# Patient Record
Sex: MALE | Race: BLACK OR AFRICAN AMERICAN | NOT HISPANIC OR LATINO | Employment: OTHER | URBAN - METROPOLITAN AREA
[De-identification: names, ages, dates, MRNs, and addresses within clinical notes are randomized per-mention and may not be internally consistent; named-entity substitution may affect disease eponyms.]

---

## 2023-03-31 ENCOUNTER — NURSING HOME VISIT (OUTPATIENT)
Dept: POST ACUTE CARE | Facility: EXTERNAL LOCATION | Age: 63
End: 2023-03-31
Payer: MEDICARE

## 2023-03-31 DIAGNOSIS — I10 PRIMARY HYPERTENSION: ICD-10-CM

## 2023-03-31 DIAGNOSIS — Z79.4 TYPE 2 DIABETES MELLITUS WITH HYPERGLYCEMIA, WITH LONG-TERM CURRENT USE OF INSULIN (MULTI): ICD-10-CM

## 2023-03-31 DIAGNOSIS — F01.50 VASCULAR DEMENTIA WITHOUT BEHAVIORAL DISTURBANCE, PSYCHOTIC DISTURBANCE, MOOD DISTURBANCE, OR ANXIETY, UNSPECIFIED DEMENTIA SEVERITY (MULTI): Primary | ICD-10-CM

## 2023-03-31 DIAGNOSIS — E11.65 TYPE 2 DIABETES MELLITUS WITH HYPERGLYCEMIA, WITH LONG-TERM CURRENT USE OF INSULIN (MULTI): ICD-10-CM

## 2023-03-31 PROCEDURE — 99308 SBSQ NF CARE LOW MDM 20: CPT | Performed by: INTERNAL MEDICINE

## 2023-03-31 NOTE — PROGRESS NOTES
Pt is doing fine , no complaint  GA: Comfortable, no distress  ROS: No SOB  Medications reviewed  Head: Normal  Neck: Soft  Heart: Regular  Lungs: Clear  Abdomen: soft    Impression: clinically doing fine, continue current management    Problem List Items Addressed This Visit          Nervous    Vascular dementia without behavioral disturbance, psychotic disturbance, mood disturbance, or anxiety (CMS/[facility]) - Primary       Circulatory    Primary hypertension       Endocrine/Metabolic    Type 2 diabetes mellitus with hyperglycemia, with long-term current use of insulin (CMS/[facility])

## 2023-03-31 NOTE — LETTER
Patient: Enid Thorne  : 1960    Encounter Date: 2023    Pt is doing fine , no complaint  GA: Comfortable, no distress  ROS: No SOB  Medications reviewed  Head: Normal  Neck: Soft  Heart: Regular  Lungs: Clear  Abdomen: soft    Impression: clinically doing fine, continue current management    Problem List Items Addressed This Visit          Nervous    Vascular dementia without behavioral disturbance, psychotic disturbance, mood disturbance, or anxiety (CMS/HCC) - Primary       Circulatory    Primary hypertension       Endocrine/Metabolic    Type 2 diabetes mellitus with hyperglycemia, with long-term current use of insulin (CMS/[facility])          Electronically Signed By: Chriss Guzman MD   3/31/23  4:43 PM

## 2023-04-21 ENCOUNTER — NURSING HOME VISIT (OUTPATIENT)
Dept: POST ACUTE CARE | Facility: EXTERNAL LOCATION | Age: 63
End: 2023-04-21
Payer: COMMERCIAL

## 2023-04-21 DIAGNOSIS — R60.0 BILATERAL LOWER EXTREMITY EDEMA: Primary | ICD-10-CM

## 2023-04-21 PROCEDURE — 99309 SBSQ NF CARE MODERATE MDM 30: CPT | Performed by: INTERNAL MEDICINE

## 2023-04-21 NOTE — PROGRESS NOTES
Pt was seen in the NH,c/o le edema  General appearance: Comfortable, no distress  PMH DM2 HTN MORBID OBESITY  ROS: No SOB  Medications reviewed  Head: Normal  Neck: Soft  Heart: Regular  Lungs: Clear  Abdomen: soft  Ext: moderate le edema      Impression: stop amlodipine,  increase lasix 40 bid, repeat bmp next week,continue current management    Problem List Items Addressed This Visit    None  Visit Diagnoses       Bilateral lower extremity edema    -  Primary

## 2023-04-21 NOTE — LETTER
Patient: Enid Thorne  : 1960    Encounter Date: 2023    Pt was seen in the NH,c/o le edema  General appearance: Comfortable, no distress  PMH DM2 HTN MORBID OBESITY  ROS: No SOB  Medications reviewed  Head: Normal  Neck: Soft  Heart: Regular  Lungs: Clear  Abdomen: soft  Ext: moderate le edema      Impression: stop amlodipine,  increase lasix 40 bid, repeat bmp next week,continue current management    Problem List Items Addressed This Visit    None  Visit Diagnoses       Bilateral lower extremity edema    -  Primary               Electronically Signed By: Chriss Guzman MD   23  3:04 PM

## 2023-04-28 ENCOUNTER — NURSING HOME VISIT (OUTPATIENT)
Dept: POST ACUTE CARE | Facility: EXTERNAL LOCATION | Age: 63
End: 2023-04-28
Payer: COMMERCIAL

## 2023-04-28 DIAGNOSIS — I10 PRIMARY HYPERTENSION: ICD-10-CM

## 2023-04-28 DIAGNOSIS — F01.B0 MODERATE VASCULAR DEMENTIA WITHOUT BEHAVIORAL DISTURBANCE, PSYCHOTIC DISTURBANCE, MOOD DISTURBANCE, OR ANXIETY (MULTI): Primary | ICD-10-CM

## 2023-04-28 PROCEDURE — 99308 SBSQ NF CARE LOW MDM 20: CPT | Performed by: INTERNAL MEDICINE

## 2023-04-28 NOTE — PROGRESS NOTES
Pt was seen in the NH,Pt is doing fine , no complaint  General appearance: Comfortable, no distress  ROS: No SOB  Medications reviewed  Head: Normal  Neck: Soft  Heart: Regular  Lungs: Clear  Abdomen: soft    Impression: clinically doing fine, continue current management    Problem List Items Addressed This Visit          Nervous    Vascular dementia without behavioral disturbance, psychotic disturbance, mood disturbance, or anxiety (CMS/HCC) - Primary       Circulatory    Primary hypertension

## 2023-04-28 NOTE — LETTER
Patient: Enid Thorne  : 1960    Encounter Date: 2023    Pt was seen in the NH,Pt is doing fine , no complaint  General appearance: Comfortable, no distress  ROS: No SOB  Medications reviewed  Head: Normal  Neck: Soft  Heart: Regular  Lungs: Clear  Abdomen: soft    Impression: clinically doing fine, continue current management    Problem List Items Addressed This Visit          Nervous    Vascular dementia without behavioral disturbance, psychotic disturbance, mood disturbance, or anxiety (CMS/HCC) - Primary       Circulatory    Primary hypertension          Electronically Signed By: Chriss Guzman MD   23  7:37 PM

## 2023-05-29 ENCOUNTER — NURSING HOME VISIT (OUTPATIENT)
Dept: POST ACUTE CARE | Facility: EXTERNAL LOCATION | Age: 63
End: 2023-05-29
Payer: COMMERCIAL

## 2023-05-29 DIAGNOSIS — F01.B0 MODERATE VASCULAR DEMENTIA WITHOUT BEHAVIORAL DISTURBANCE, PSYCHOTIC DISTURBANCE, MOOD DISTURBANCE, OR ANXIETY (MULTI): Primary | ICD-10-CM

## 2023-05-29 DIAGNOSIS — E66.8 MODERATE OBESITY: ICD-10-CM

## 2023-05-29 PROCEDURE — 99308 SBSQ NF CARE LOW MDM 20: CPT | Performed by: INTERNAL MEDICINE

## 2023-05-29 NOTE — LETTER
Patient: Enid Thorne  : 1960    Encounter Date: 2023    Pt was seen in the NH,Pt is doing fine , no complaint  General appearance: Comfortable, no distress  ROS: No SOB  Medications reviewed  Head: Normal  Neck: Soft  Heart: Regular  Lungs: Clear  Abdomen: soft    Impression: clinically doing fine, continue current management    Problem List Items Addressed This Visit          Nervous    Vascular dementia without behavioral disturbance, psychotic disturbance, mood disturbance, or anxiety (CMS/HCC) - Primary     Other Visit Diagnoses       Moderate obesity                   Electronically Signed By: Chriss Guzman MD   23  8:15 PM

## 2023-05-30 NOTE — PROGRESS NOTES
Pt was seen in the NH,Pt is doing fine , no complaint  General appearance: Comfortable, no distress  ROS: No SOB  Medications reviewed  Head: Normal  Neck: Soft  Heart: Regular  Lungs: Clear  Abdomen: soft    Impression: clinically doing fine, continue current management    Problem List Items Addressed This Visit          Nervous    Vascular dementia without behavioral disturbance, psychotic disturbance, mood disturbance, or anxiety (CMS/HCC) - Primary     Other Visit Diagnoses       Moderate obesity

## 2023-06-01 ENCOUNTER — NURSING HOME VISIT (OUTPATIENT)
Dept: POST ACUTE CARE | Facility: EXTERNAL LOCATION | Age: 63
End: 2023-06-01
Payer: COMMERCIAL

## 2023-06-01 DIAGNOSIS — L30.9 DERMATITIS: Primary | ICD-10-CM

## 2023-06-01 PROCEDURE — 99308 SBSQ NF CARE LOW MDM 20: CPT | Performed by: NURSE PRACTITIONER

## 2023-06-01 NOTE — LETTER
Patient: Enid Thorne  : 1960    Encounter Date: 2023    Subjective  Patient ID: Enid Thorne is a 62 y.o. male who presents for No chief complaint on file..  63 yo male at [facility] with history of SANCHO, DMII, HTN, CKD.  Staff reports resident has been using Clobetasol Propanate since January for dermatitis on left foot without success and would like this medication discontinued.          Review of Systems   Constitutional:  Negative for fever.   Respiratory:  Negative for cough, shortness of breath and wheezing.         History of SANCHO   Cardiovascular:  Positive for leg swelling. Negative for chest pain.   Genitourinary:         Incontinent of urine and bowel   Skin:         Dermatitis on left foot.  Other skin without dryness or rashes.       Objective  Physical Exam  Constitutional:       Appearance: He is obese.   Cardiovascular:      Rate and Rhythm: Normal rate and regular rhythm.   Pulmonary:      Effort: Pulmonary effort is normal.      Breath sounds: Normal breath sounds.   Abdominal:      General: Bowel sounds are normal.   Musculoskeletal:         General: Swelling present.   Skin:     General: Skin is warm and dry.      Comments: Raised erythematous area on left dorsal foot, approximately 3cm by 3cm.  No open areas to feet noted.     Neurological:      Mental Status: He is alert.         No current outpatient medications on file.     Assessment/Plan  Problem List Items Addressed This Visit          Infectious/Inflammatory    Dermatitis - Primary   Refer back to dermatologist for skin treatment.             Electronically Signed By: BRANDON Myrick   23  7:25 AM

## 2023-06-07 PROBLEM — L30.9 DERMATITIS: Status: ACTIVE | Noted: 2023-06-07

## 2023-06-07 ASSESSMENT — ENCOUNTER SYMPTOMS
SHORTNESS OF BREATH: 0
WHEEZING: 0
COUGH: 0
FEVER: 0

## 2023-06-07 NOTE — PROGRESS NOTES
Subjective   Patient ID: Enid Thorne is a 62 y.o. male who presents for No chief complaint on file..  63 yo male at [facility] with history of SANCHO, DMII, HTN, CKD.  Staff reports resident has been using Clobetasol Propanate since January for dermatitis on left foot without success and would like this medication discontinued.          Review of Systems   Constitutional:  Negative for fever.   Respiratory:  Negative for cough, shortness of breath and wheezing.         History of SANCHO   Cardiovascular:  Positive for leg swelling. Negative for chest pain.   Genitourinary:         Incontinent of urine and bowel   Skin:         Dermatitis on left foot.  Other skin without dryness or rashes.       Objective   Physical Exam  Constitutional:       Appearance: He is obese.   Cardiovascular:      Rate and Rhythm: Normal rate and regular rhythm.   Pulmonary:      Effort: Pulmonary effort is normal.      Breath sounds: Normal breath sounds.   Abdominal:      General: Bowel sounds are normal.   Musculoskeletal:         General: Swelling present.   Skin:     General: Skin is warm and dry.      Comments: Raised erythematous area on left dorsal foot, approximately 3cm by 3cm.  No open areas to feet noted.     Neurological:      Mental Status: He is alert.         No current outpatient medications on file.     Assessment/Plan   Problem List Items Addressed This Visit          Infectious/Inflammatory    Dermatitis - Primary   Refer back to dermatologist for skin treatment.

## 2023-06-25 ENCOUNTER — NURSING HOME VISIT (OUTPATIENT)
Dept: POST ACUTE CARE | Facility: EXTERNAL LOCATION | Age: 63
End: 2023-06-25
Payer: COMMERCIAL

## 2023-06-25 DIAGNOSIS — Z79.4 TYPE 2 DIABETES MELLITUS WITH HYPERGLYCEMIA, WITH LONG-TERM CURRENT USE OF INSULIN (MULTI): ICD-10-CM

## 2023-06-25 DIAGNOSIS — E11.65 TYPE 2 DIABETES MELLITUS WITH HYPERGLYCEMIA, WITH LONG-TERM CURRENT USE OF INSULIN (MULTI): ICD-10-CM

## 2023-06-25 DIAGNOSIS — F01.B0 MODERATE VASCULAR DEMENTIA WITHOUT BEHAVIORAL DISTURBANCE, PSYCHOTIC DISTURBANCE, MOOD DISTURBANCE, OR ANXIETY (MULTI): Primary | ICD-10-CM

## 2023-06-25 PROCEDURE — 99308 SBSQ NF CARE LOW MDM 20: CPT | Performed by: INTERNAL MEDICINE

## 2023-06-25 NOTE — LETTER
Patient: Enid Thorne  : 1960    Encounter Date: 2023    Pt was seen in the NH,Pt is in his usual state , no complaint  General appearance: Comfortable, no distress  ROS: No SOB  Medications reviewed  Head: Normal  Neck: Soft  Heart: Regular  Lungs: Clear  Abdomen: soft    Impression: clinically doing fine, continue current management    Problem List Items Addressed This Visit       Vascular dementia without behavioral disturbance, psychotic disturbance, mood disturbance, or anxiety (CMS/HCC) - Primary    Type 2 diabetes mellitus with hyperglycemia, with long-term current use of insulin (CMS/HCC)          Electronically Signed By: Chriss Guzman MD   23  5:06 PM

## 2023-06-25 NOTE — PROGRESS NOTES
Pt was seen in the NH,Pt is in his usual state , no complaint  General appearance: Comfortable, no distress  ROS: No SOB  Medications reviewed  Head: Normal  Neck: Soft  Heart: Regular  Lungs: Clear  Abdomen: soft    Impression: clinically doing fine, continue current management    Problem List Items Addressed This Visit       Vascular dementia without behavioral disturbance, psychotic disturbance, mood disturbance, or anxiety (CMS/HCC) - Primary    Type 2 diabetes mellitus with hyperglycemia, with long-term current use of insulin (CMS/[facility])

## 2023-07-30 ENCOUNTER — NURSING HOME VISIT (OUTPATIENT)
Dept: POST ACUTE CARE | Facility: EXTERNAL LOCATION | Age: 63
End: 2023-07-30
Payer: COMMERCIAL

## 2023-07-30 DIAGNOSIS — F01.50 VASCULAR DEMENTIA WITHOUT BEHAVIORAL DISTURBANCE, PSYCHOTIC DISTURBANCE, MOOD DISTURBANCE, OR ANXIETY, UNSPECIFIED DEMENTIA SEVERITY (MULTI): Primary | ICD-10-CM

## 2023-07-30 DIAGNOSIS — E11.3512 TYPE 2 DIABETES MELLITUS WITH LEFT EYE AFFECTED BY PROLIFERATIVE RETINOPATHY AND MACULAR EDEMA, WITH LONG-TERM CURRENT USE OF INSULIN (MULTI): ICD-10-CM

## 2023-07-30 DIAGNOSIS — F32.4 MAJOR DEPRESSIVE DISORDER, SINGLE EPISODE, IN PARTIAL REMISSION (CMS-HCC): ICD-10-CM

## 2023-07-30 DIAGNOSIS — Z89.419: ICD-10-CM

## 2023-07-30 DIAGNOSIS — N18.32 STAGE 3B CHRONIC KIDNEY DISEASE (MULTI): ICD-10-CM

## 2023-07-30 DIAGNOSIS — Z79.4 TYPE 2 DIABETES MELLITUS WITH LEFT EYE AFFECTED BY PROLIFERATIVE RETINOPATHY AND MACULAR EDEMA, WITH LONG-TERM CURRENT USE OF INSULIN (MULTI): ICD-10-CM

## 2023-07-30 PROCEDURE — 99308 SBSQ NF CARE LOW MDM 20: CPT | Performed by: INTERNAL MEDICINE

## 2023-07-30 NOTE — LETTER
Patient: Enid Thorne  : 1960    Encounter Date: 2023    Pt was seen in the NH,Pt is in usual state , no complaint  General appearance: Comfortable, no distress  ROS: No SOB  Medications reviewed  Head: Normal  Neck: Soft  Heart: Regular  Lungs: Clear  Abdomen: soft    Impression: clinically doing fine, continue current management  ALL ASSESSED CHRONIC CONDITIONS ARE STABLE OR ADDRESSED.      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

## 2023-07-31 NOTE — PROGRESS NOTES
Pt was seen in the NH,Pt is in usual state , no complaint  General appearance: Comfortable, no distress  ROS: No SOB  Medications reviewed  Head: Normal  Neck: Soft  Heart: Regular  Lungs: Clear  Abdomen: soft    Impression: clinically doing fine, continue current management  ALL ASSESSED CHRONIC CONDITIONS ARE STABLE OR ADDRESSED.      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

## 2023-08-31 ENCOUNTER — NURSING HOME VISIT (OUTPATIENT)
Dept: POST ACUTE CARE | Facility: EXTERNAL LOCATION | Age: 63
End: 2023-08-31
Payer: MEDICARE

## 2023-08-31 DIAGNOSIS — F01.B0 MODERATE VASCULAR DEMENTIA WITHOUT BEHAVIORAL DISTURBANCE, PSYCHOTIC DISTURBANCE, MOOD DISTURBANCE, OR ANXIETY (MULTI): Primary | ICD-10-CM

## 2023-08-31 DIAGNOSIS — E66.01 MORBID OBESITY (MULTI): ICD-10-CM

## 2023-08-31 PROCEDURE — 99308 SBSQ NF CARE LOW MDM 20: CPT | Performed by: INTERNAL MEDICINE

## 2023-08-31 NOTE — PROGRESS NOTES
Pt was seen in the NH.  Pt is in usual state , no complaint  General appearance: Comfortable, no distress  ROS: No SOB  Medications reviewed  Head: Normal  Neck: Soft  Heart: Regular  Lungs: Clear  Abdomen: soft    Impression: clinically doing fine, continue current management    Problem List Items Addressed This Visit       Vascular dementia without behavioral disturbance, psychotic disturbance, mood disturbance, or anxiety (CMS/HCC) - Primary     Other Visit Diagnoses       Morbid obesity (CMS/HCC)

## 2023-08-31 NOTE — LETTER
Patient: Enid Thorne  : 1960    Encounter Date: 2023    Pt was seen in the NH.  Pt is in usual state , no complaint  General appearance: Comfortable, no distress  ROS: No SOB  Medications reviewed  Head: Normal  Neck: Soft  Heart: Regular  Lungs: Clear  Abdomen: soft    Impression: clinically doing fine, continue current management    Problem List Items Addressed This Visit       Vascular dementia without behavioral disturbance, psychotic disturbance, mood disturbance, or anxiety (CMS/HCC) - Primary     Other Visit Diagnoses       Morbid obesity (CMS/HCC)                   Electronically Signed By: Chriss Guzman MD   23  7:43 PM

## 2023-09-27 LAB — HEMOGLOBIN A1C/HEMOGLOBIN TOTAL IN BLOOD EXTERNAL: 6.4 %

## 2023-09-28 ENCOUNTER — NURSING HOME VISIT (OUTPATIENT)
Dept: POST ACUTE CARE | Facility: EXTERNAL LOCATION | Age: 63
End: 2023-09-28
Payer: MEDICARE

## 2023-09-28 DIAGNOSIS — Z79.4 TYPE 2 DIABETES MELLITUS WITH HYPERGLYCEMIA, WITH LONG-TERM CURRENT USE OF INSULIN (MULTI): ICD-10-CM

## 2023-09-28 DIAGNOSIS — F01.B0 MODERATE VASCULAR DEMENTIA WITHOUT BEHAVIORAL DISTURBANCE, PSYCHOTIC DISTURBANCE, MOOD DISTURBANCE, OR ANXIETY (MULTI): Primary | ICD-10-CM

## 2023-09-28 DIAGNOSIS — E11.65 TYPE 2 DIABETES MELLITUS WITH HYPERGLYCEMIA, WITH LONG-TERM CURRENT USE OF INSULIN (MULTI): ICD-10-CM

## 2023-09-28 DIAGNOSIS — R53.81 DEBILITY: ICD-10-CM

## 2023-09-28 PROCEDURE — 99308 SBSQ NF CARE LOW MDM 20: CPT | Performed by: INTERNAL MEDICINE

## 2023-09-28 NOTE — LETTER
Patient: Enid Thorne  : 1960    Encounter Date: 2023    Pt was seen in the NH.  Pt is in usual state , no complaint  General appearance: Comfortable, no distress  ROS: No SOB  Medications reviewed  Head: Normal  Neck: Soft  Heart: Regular  Lungs: Clear  Abdomen: soft    Impression: clinically doing fine, continue current management    Problem List Items Addressed This Visit       Vascular dementia without behavioral disturbance, psychotic disturbance, mood disturbance, or anxiety (CMS/HCC) - Primary    Type 2 diabetes mellitus with hyperglycemia, with long-term current use of insulin (CMS/[facility])     Other Visit Diagnoses       Debility                   Electronically Signed By: Chriss Guzman MD   23 12:38 PM

## 2023-09-28 NOTE — PROGRESS NOTES
Pt was seen in the NH.  Pt is in usual state , no complaint  General appearance: Comfortable, no distress  ROS: No SOB  Medications reviewed  Head: Normal  Neck: Soft  Heart: Regular  Lungs: Clear  Abdomen: soft    Impression: clinically doing fine, continue current management    Problem List Items Addressed This Visit       Vascular dementia without behavioral disturbance, psychotic disturbance, mood disturbance, or anxiety (CMS/HCC) - Primary    Type 2 diabetes mellitus with hyperglycemia, with long-term current use of insulin (CMS/[facility])     Other Visit Diagnoses       Debility

## 2023-10-07 ENCOUNTER — NURSING HOME VISIT (OUTPATIENT)
Dept: POST ACUTE CARE | Facility: EXTERNAL LOCATION | Age: 63
End: 2023-10-07
Payer: MEDICARE

## 2023-10-07 VITALS — SYSTOLIC BLOOD PRESSURE: 104 MMHG | WEIGHT: 315 LBS | DIASTOLIC BLOOD PRESSURE: 64 MMHG

## 2023-10-07 DIAGNOSIS — F01.B0 MODERATE VASCULAR DEMENTIA WITHOUT BEHAVIORAL DISTURBANCE, PSYCHOTIC DISTURBANCE, MOOD DISTURBANCE, OR ANXIETY (MULTI): ICD-10-CM

## 2023-10-07 DIAGNOSIS — Z00.00 HEALTHCARE MAINTENANCE: Primary | ICD-10-CM

## 2023-10-07 DIAGNOSIS — R53.81 DEBILITY: ICD-10-CM

## 2023-10-07 PROCEDURE — G0439 PPPS, SUBSEQ VISIT: HCPCS | Performed by: INTERNAL MEDICINE

## 2023-10-07 ASSESSMENT — ACTIVITIES OF DAILY LIVING (ADL)
DOING_HOUSEWORK: TOTAL CARE
MANAGING_FINANCES: TOTAL CARE
DRESSING: DEPENDENT
GROCERY_SHOPPING: TOTAL CARE
BATHING: DEPENDENT
TAKING_MEDICATION: TOTAL CARE

## 2023-10-07 NOTE — PROGRESS NOTES
Pt was seen in the NH for wellness exam  Pt is in usual state , no complaint  General appearance: Comfortable, no distress  No screening test indicated    Problem List Items Addressed This Visit       Vascular dementia without behavioral disturbance, psychotic disturbance, mood disturbance, or anxiety (CMS/HCC)    Debility     Other Visit Diagnoses       Healthcare maintenance    -  Primary

## 2023-10-07 NOTE — LETTER
Patient: Enid Thorne  : 1960    Encounter Date: 10/07/2023    Pt was seen in the NH for wellness exam  Pt is in usual state , no complaint  General appearance: Comfortable, no distress  No screening test indicated    Problem List Items Addressed This Visit       Vascular dementia without behavioral disturbance, psychotic disturbance, mood disturbance, or anxiety (CMS/HCC)    Debility     Other Visit Diagnoses       Healthcare maintenance    -  Primary                 Electronically Signed By: Chriss Guzman MD   10/7/23  2:53 PM

## 2023-10-30 ENCOUNTER — NURSING HOME VISIT (OUTPATIENT)
Dept: POST ACUTE CARE | Facility: EXTERNAL LOCATION | Age: 63
End: 2023-10-30
Payer: MEDICARE

## 2023-10-30 DIAGNOSIS — F01.B0 MODERATE VASCULAR DEMENTIA WITHOUT BEHAVIORAL DISTURBANCE, PSYCHOTIC DISTURBANCE, MOOD DISTURBANCE, OR ANXIETY (MULTI): ICD-10-CM

## 2023-10-30 DIAGNOSIS — R53.81 DEBILITY: Primary | ICD-10-CM

## 2023-10-30 PROCEDURE — 99308 SBSQ NF CARE LOW MDM 20: CPT | Performed by: INTERNAL MEDICINE

## 2023-10-30 NOTE — LETTER
Patient: Enid Thorne  : 1960    Encounter Date: 10/30/2023    Pt was seen in the NH.  Pt is in usual state , no complaint  General appearance: Comfortable, no distress  ROS: No SOB  Medications reviewed  Head: Normal  Neck: Soft  Heart: Regular  Lungs: Clear  Abdomen: soft    Impression: clinically doing fine, continue current management    Problem List Items Addressed This Visit       Vascular dementia without behavioral disturbance, psychotic disturbance, mood disturbance, or anxiety (CMS/HCC)    Debility - Primary          Electronically Signed By: Chriss Guzman MD   10/30/23 10:48 PM

## 2023-10-31 NOTE — PROGRESS NOTES
Pt was seen in the NH.  Pt is in usual state , no complaint  General appearance: Comfortable, no distress  ROS: No SOB  Medications reviewed  Head: Normal  Neck: Soft  Heart: Regular  Lungs: Clear  Abdomen: soft    Impression: clinically doing fine, continue current management    Problem List Items Addressed This Visit       Vascular dementia without behavioral disturbance, psychotic disturbance, mood disturbance, or anxiety (CMS/HCC)    Debility - Primary

## 2023-11-28 ENCOUNTER — NURSING HOME VISIT (OUTPATIENT)
Dept: POST ACUTE CARE | Facility: EXTERNAL LOCATION | Age: 63
End: 2023-11-28
Payer: MEDICARE

## 2023-11-28 DIAGNOSIS — E11.65 TYPE 2 DIABETES MELLITUS WITH HYPERGLYCEMIA, WITH LONG-TERM CURRENT USE OF INSULIN (MULTI): Primary | ICD-10-CM

## 2023-11-28 DIAGNOSIS — F01.B0 MODERATE VASCULAR DEMENTIA WITHOUT BEHAVIORAL DISTURBANCE, PSYCHOTIC DISTURBANCE, MOOD DISTURBANCE, OR ANXIETY (MULTI): ICD-10-CM

## 2023-11-28 DIAGNOSIS — Z79.4 TYPE 2 DIABETES MELLITUS WITH HYPERGLYCEMIA, WITH LONG-TERM CURRENT USE OF INSULIN (MULTI): Primary | ICD-10-CM

## 2023-11-28 PROCEDURE — 99308 SBSQ NF CARE LOW MDM 20: CPT | Performed by: INTERNAL MEDICINE

## 2023-11-28 NOTE — LETTER
Patient: Enid Thorne  : 1960    Encounter Date: 2023    Pt was seen in the NH.  Pt is in usual state , no complaint  General appearance: Comfortable, no distress  ROS: No SOB  Medications reviewed  Head: Normal  Neck: Soft  Heart: Regular  Lungs: Clear  Abdomen: soft    Impression: clinically doing fine, continue current management    Problem List Items Addressed This Visit       Vascular dementia without behavioral disturbance, psychotic disturbance, mood disturbance, or anxiety (CMS/HCC)    Type 2 diabetes mellitus with hyperglycemia, with long-term current use of insulin (CMS/HCC) - Primary          Electronically Signed By: Chriss Guzman MD   23  3:45 PM

## 2023-11-28 NOTE — PROGRESS NOTES
Pt was seen in the NH.  Pt is in usual state , no complaint  General appearance: Comfortable, no distress  ROS: No SOB  Medications reviewed  Head: Normal  Neck: Soft  Heart: Regular  Lungs: Clear  Abdomen: soft    Impression: clinically doing fine, continue current management    Problem List Items Addressed This Visit       Vascular dementia without behavioral disturbance, psychotic disturbance, mood disturbance, or anxiety (CMS/HCC)    Type 2 diabetes mellitus with hyperglycemia, with long-term current use of insulin (CMS/HCC) - Primary

## 2023-12-16 ENCOUNTER — NURSING HOME VISIT (OUTPATIENT)
Dept: POST ACUTE CARE | Facility: EXTERNAL LOCATION | Age: 63
End: 2023-12-16
Payer: MEDICARE

## 2023-12-16 DIAGNOSIS — R53.81 DEBILITY: Primary | ICD-10-CM

## 2023-12-16 DIAGNOSIS — N18.32 STAGE 3B CHRONIC KIDNEY DISEASE (MULTI): ICD-10-CM

## 2023-12-16 PROCEDURE — 99308 SBSQ NF CARE LOW MDM 20: CPT | Performed by: INTERNAL MEDICINE

## 2023-12-16 NOTE — PROGRESS NOTES
Pt was seen in the NH.  Pt is in usual state , no complaint  General appearance: Comfortable, no distress  ROS: No SOB  Medications reviewed  Head: Normal  Neck: Soft  Heart: Regular  Lungs: Clear  Abdomen: soft    Impression: clinically doing fine, continue current management    Problem List Items Addressed This Visit       Stage 3b chronic kidney disease (CMS/HCC)    Debility - Primary

## 2023-12-16 NOTE — LETTER
Patient: Enid Thorne  : 1960    Encounter Date: 2023    Pt was seen in the NH.  Pt is in usual state , no complaint  General appearance: Comfortable, no distress  ROS: No SOB  Medications reviewed  Head: Normal  Neck: Soft  Heart: Regular  Lungs: Clear  Abdomen: soft    Impression: clinically doing fine, continue current management    Problem List Items Addressed This Visit       Stage 3b chronic kidney disease (CMS/HCC)    Debility - Primary          Electronically Signed By: Chriss Guzman MD   23  5:55 PM

## 2024-01-31 ENCOUNTER — NURSING HOME VISIT (OUTPATIENT)
Dept: POST ACUTE CARE | Facility: EXTERNAL LOCATION | Age: 64
End: 2024-01-31
Payer: MEDICARE

## 2024-01-31 DIAGNOSIS — E11.65 TYPE 2 DIABETES MELLITUS WITH HYPERGLYCEMIA, WITH LONG-TERM CURRENT USE OF INSULIN (MULTI): ICD-10-CM

## 2024-01-31 DIAGNOSIS — E66.01 MORBID OBESITY (MULTI): ICD-10-CM

## 2024-01-31 DIAGNOSIS — E11.3512 TYPE 2 DIABETES MELLITUS WITH LEFT EYE AFFECTED BY PROLIFERATIVE RETINOPATHY AND MACULAR EDEMA, WITH LONG-TERM CURRENT USE OF INSULIN (MULTI): ICD-10-CM

## 2024-01-31 DIAGNOSIS — F32.4 MAJOR DEPRESSIVE DISORDER, SINGLE EPISODE, IN PARTIAL REMISSION (CMS-HCC): ICD-10-CM

## 2024-01-31 DIAGNOSIS — Z79.4 TYPE 2 DIABETES MELLITUS WITH LEFT EYE AFFECTED BY PROLIFERATIVE RETINOPATHY AND MACULAR EDEMA, WITH LONG-TERM CURRENT USE OF INSULIN (MULTI): ICD-10-CM

## 2024-01-31 DIAGNOSIS — G81.10 SPASTIC HEMIPLEGIA, UNSPECIFIED ETIOLOGY, UNSPECIFIED LATERALITY (MULTI): Primary | ICD-10-CM

## 2024-01-31 DIAGNOSIS — I73.9 PERIPHERAL VASCULAR DISEASE, UNSPECIFIED (CMS-HCC): ICD-10-CM

## 2024-01-31 DIAGNOSIS — F01.B0 MODERATE VASCULAR DEMENTIA WITHOUT BEHAVIORAL DISTURBANCE, PSYCHOTIC DISTURBANCE, MOOD DISTURBANCE, OR ANXIETY (MULTI): ICD-10-CM

## 2024-01-31 DIAGNOSIS — N18.32 STAGE 3B CHRONIC KIDNEY DISEASE (MULTI): ICD-10-CM

## 2024-01-31 DIAGNOSIS — Z79.4 TYPE 2 DIABETES MELLITUS WITH HYPERGLYCEMIA, WITH LONG-TERM CURRENT USE OF INSULIN (MULTI): ICD-10-CM

## 2024-01-31 PROCEDURE — 99308 SBSQ NF CARE LOW MDM 20: CPT | Performed by: INTERNAL MEDICINE

## 2024-01-31 NOTE — PROGRESS NOTES
Pt was seen in the NH.  Pt is in usual state , no complaint  General appearance: Comfortable, no distress  ROS: No SOB  Medications reviewed  Head: Normal  Neck: Soft  Heart: Regular  Lungs: Clear  Abdomen: soft    Impression: clinically doing fine,comfort type of care, continue current management    Problem List Items Addressed This Visit       Vascular dementia without behavioral disturbance, psychotic disturbance, mood disturbance, or anxiety (CMS/[facility])    Type 2 diabetes mellitus with hyperglycemia, with long-term current use of insulin (CMS/[facility])    Major depressive disorder, single episode, in partial remission (CMS/[facility])    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

## 2024-01-31 NOTE — LETTER
Patient: Enid Thorne  : 1960    Encounter Date: 2024    Pt was seen in the NH.  Pt is in usual state , no complaint  General appearance: Comfortable, no distress  ROS: No SOB  Medications reviewed  Head: Normal  Neck: Soft  Heart: Regular  Lungs: Clear  Abdomen: soft    Impression: clinically doing fine,comfort type of care, continue current management    Problem List Items Addressed This Visit       Vascular dementia without behavioral disturbance, psychotic disturbance, mood disturbance, or anxiety (CMS/HCC)    Type 2 diabetes mellitus with hyperglycemia, with long-term current use of insulin (CMS/HCC)    Major depressive disorder, single episode, in partial remission (CMS/HCC)    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

## 2024-02-28 ENCOUNTER — NURSING HOME VISIT (OUTPATIENT)
Dept: POST ACUTE CARE | Facility: EXTERNAL LOCATION | Age: 64
End: 2024-02-28
Payer: MEDICARE

## 2024-02-28 DIAGNOSIS — R53.81 DEBILITY: ICD-10-CM

## 2024-02-28 DIAGNOSIS — F01.B0 MODERATE VASCULAR DEMENTIA WITHOUT BEHAVIORAL DISTURBANCE, PSYCHOTIC DISTURBANCE, MOOD DISTURBANCE, OR ANXIETY (MULTI): Primary | ICD-10-CM

## 2024-02-28 PROCEDURE — 99308 SBSQ NF CARE LOW MDM 20: CPT | Performed by: INTERNAL MEDICINE

## 2024-02-28 NOTE — LETTER
Patient: Enid Thorne  : 1960    Encounter Date: 2024    Pt was seen in the NH.  Pt is in usual state , no complaint  General appearance: Comfortable, no distress  ROS: No SOB  Medications reviewed  Head: Normal  Neck: Soft  Heart: Regular  Lungs: Clear  Abdomen: soft    Impression: clinically doing fine, continue current management    Problem List Items Addressed This Visit       Vascular dementia without behavioral disturbance, psychotic disturbance, mood disturbance, or anxiety (CMS/HCC) - Primary    Debility          Electronically Signed By: Chriss Guzman MD   24  6:25 PM

## 2024-02-28 NOTE — PROGRESS NOTES
Pt was seen in the NH.  Pt is in usual state , no complaint  General appearance: Comfortable, no distress  ROS: No SOB  Medications reviewed  Head: Normal  Neck: Soft  Heart: Regular  Lungs: Clear  Abdomen: soft    Impression: clinically doing fine, continue current management    Problem List Items Addressed This Visit       Vascular dementia without behavioral disturbance, psychotic disturbance, mood disturbance, or anxiety (CMS/HCC) - Primary    Debility

## 2024-03-27 ENCOUNTER — NURSING HOME VISIT (OUTPATIENT)
Dept: POST ACUTE CARE | Facility: EXTERNAL LOCATION | Age: 64
End: 2024-03-27
Payer: MEDICARE

## 2024-03-27 DIAGNOSIS — E66.01 MORBID OBESITY (MULTI): ICD-10-CM

## 2024-03-27 DIAGNOSIS — R53.81 DEBILITY: ICD-10-CM

## 2024-03-27 DIAGNOSIS — F01.B0 MODERATE VASCULAR DEMENTIA WITHOUT BEHAVIORAL DISTURBANCE, PSYCHOTIC DISTURBANCE, MOOD DISTURBANCE, OR ANXIETY (MULTI): Primary | ICD-10-CM

## 2024-03-27 PROCEDURE — 99308 SBSQ NF CARE LOW MDM 20: CPT | Performed by: INTERNAL MEDICINE

## 2024-03-27 NOTE — PROGRESS NOTES
Pt was seen in the NH.  Pt is in usual state , no complaint  General appearance: Comfortable, no distress  ROS: No SOB  Medications reviewed  Head: Normal  Neck: Soft  Heart: Regular  Lungs: Clear  Abdomen: soft    Impression: clinically doing fine, continue current management    Problem List Items Addressed This Visit       Vascular dementia without behavioral disturbance, psychotic disturbance, mood disturbance, or anxiety (CMS/HCC) - Primary    Debility    Morbid obesity (CMS/HCC)

## 2024-03-27 NOTE — LETTER
Patient: Enid Thorne  : 1960    Encounter Date: 2024    Pt was seen in the NH.  Pt is in usual state , no complaint  General appearance: Comfortable, no distress  ROS: No SOB  Medications reviewed  Head: Normal  Neck: Soft  Heart: Regular  Lungs: Clear  Abdomen: soft    Impression: clinically doing fine, continue current management    Problem List Items Addressed This Visit       Vascular dementia without behavioral disturbance, psychotic disturbance, mood disturbance, or anxiety (CMS/HCC) - Primary    Debility    Morbid obesity (CMS/HCC)          Electronically Signed By: Chriss Guzman MD   3/27/24  3:49 PM

## 2024-04-29 ENCOUNTER — NURSING HOME VISIT (OUTPATIENT)
Dept: POST ACUTE CARE | Facility: EXTERNAL LOCATION | Age: 64
End: 2024-04-29
Payer: MEDICARE

## 2024-04-29 DIAGNOSIS — E11.65 TYPE 2 DIABETES MELLITUS WITH HYPERGLYCEMIA, WITH LONG-TERM CURRENT USE OF INSULIN (MULTI): Primary | ICD-10-CM

## 2024-04-29 DIAGNOSIS — Z79.4 TYPE 2 DIABETES MELLITUS WITH HYPERGLYCEMIA, WITH LONG-TERM CURRENT USE OF INSULIN (MULTI): Primary | ICD-10-CM

## 2024-04-29 PROCEDURE — 99308 SBSQ NF CARE LOW MDM 20: CPT | Performed by: INTERNAL MEDICINE

## 2024-04-29 NOTE — LETTER
Patient: Enid Thorne  : 1960    Encounter Date: 2024    Pt was seen in the NH.  Pt is in usual state , no complaint  General appearance: Comfortable, no distress  ROS: No SOB  Medications reviewed  Head: Normal  Neck: Soft  Heart: Regular  Lungs: Clear  Abdomen: soft    Plan:   1)clinically doing fine  2) To continue Fiasp 10 am 18 noon 16 pm    Problem List Items Addressed This Visit       Type 2 diabetes mellitus with hyperglycemia, with long-term current use of insulin ([facility]) - Primary          Electronically Signed By: Chriss Guzman MD   24  8:33 PM

## 2024-04-30 NOTE — PROGRESS NOTES
Pt was seen in the NH.  Pt is in usual state , no complaint  General appearance: Comfortable, no distress  ROS: No SOB  Medications reviewed  Head: Normal  Neck: Soft  Heart: Regular  Lungs: Clear  Abdomen: soft    Plan:   1)clinically doing fine  2) To continue Fiasp 10 am 18 noon 16 pm    Problem List Items Addressed This Visit       Type 2 diabetes mellitus with hyperglycemia, with long-term current use of insulin (Multi) - Primary

## 2024-05-31 ENCOUNTER — NURSING HOME VISIT (OUTPATIENT)
Dept: POST ACUTE CARE | Facility: EXTERNAL LOCATION | Age: 64
End: 2024-05-31
Payer: MEDICARE

## 2024-05-31 DIAGNOSIS — I10 PRIMARY HYPERTENSION: Primary | ICD-10-CM

## 2024-05-31 PROCEDURE — 99308 SBSQ NF CARE LOW MDM 20: CPT | Performed by: INTERNAL MEDICINE

## 2024-05-31 NOTE — LETTER
Patient: Enid Thorne  : 1960    Encounter Date: 2024    Pt was seen in the NH.  Pt is in usual state , no complaint  General appearance: Comfortable, no distress  ROS: No SOB  Medications reviewed  Head: Normal  Neck: Soft  Heart: Regular  Lungs: Clear  Abdomen: soft    Plan:   1)clinically doing fine  2) To dc lisinopril    Problem List Items Addressed This Visit       Primary hypertension - Primary          Electronically Signed By: Chriss Guzman MD   24 10:02 PM

## 2024-06-01 NOTE — PROGRESS NOTES
Pt was seen in the NH.  Pt is in usual state , no complaint  General appearance: Comfortable, no distress  ROS: No SOB  Medications reviewed  Head: Normal  Neck: Soft  Heart: Regular  Lungs: Clear  Abdomen: soft    Plan:   1)clinically doing fine  2) To dc lisinopril    Problem List Items Addressed This Visit       Primary hypertension - Primary

## 2024-06-26 ENCOUNTER — NURSING HOME VISIT (OUTPATIENT)
Dept: POST ACUTE CARE | Facility: EXTERNAL LOCATION | Age: 64
End: 2024-06-26
Payer: MEDICARE

## 2024-06-26 DIAGNOSIS — E78.00 HYPERCHOLESTEROLEMIA: Primary | ICD-10-CM

## 2024-06-26 PROCEDURE — 99308 SBSQ NF CARE LOW MDM 20: CPT | Performed by: INTERNAL MEDICINE

## 2024-06-26 NOTE — PROGRESS NOTES
Pt was seen in the NH.  Pt is in usual state , no complaint  General appearance: Comfortable, no distress  ROS: No SOB  Medications reviewed  Head: Normal  Neck: Soft  Heart: Regular  Lungs: Clear  Abdomen: soft    Plan:   1)clinically doing fine  2) To continue lipitor 80 mg daily    Problem List Items Addressed This Visit    None  Visit Diagnoses       Hypercholesterolemia    -  Primary

## 2024-06-26 NOTE — LETTER
Patient: Enid Thorne  : 1960    Encounter Date: 2024    Pt was seen in the NH.  Pt is in usual state , no complaint  General appearance: Comfortable, no distress  ROS: No SOB  Medications reviewed  Head: Normal  Neck: Soft  Heart: Regular  Lungs: Clear  Abdomen: soft    Plan:   1)clinically doing fine  2) To continue lipitor 80 mg daily    Problem List Items Addressed This Visit    None  Visit Diagnoses       Hypercholesterolemia    -  Primary               Electronically Signed By: Chriss Guzman MD   24  3:49 PM

## 2024-07-31 ENCOUNTER — NURSING HOME VISIT (OUTPATIENT)
Dept: POST ACUTE CARE | Facility: EXTERNAL LOCATION | Age: 64
End: 2024-07-31
Payer: MEDICARE

## 2024-07-31 DIAGNOSIS — I10 PRIMARY HYPERTENSION: Primary | ICD-10-CM

## 2024-07-31 DIAGNOSIS — Z89.419: ICD-10-CM

## 2024-07-31 PROCEDURE — 99308 SBSQ NF CARE LOW MDM 20: CPT | Performed by: INTERNAL MEDICINE

## 2024-07-31 NOTE — LETTER
Patient: Enid Thorne  : 1960    Encounter Date: 2024    Pt was seen in the NH.  Pt is in usual state , no complaint  General appearance: Comfortable, no distress  ROS: No SOB  Medications reviewed  Head: Normal  Neck: Soft  Heart: Regular  Lungs: Clear  Abdomen: soft    Plan:   1)clinically doing fine  2) To continue losartan 50 mg bid    Problem List Items Addressed This Visit       Primary hypertension - Primary    Acquired absence of great toe, unspecified laterality (Multi)          Electronically Signed By: Chriss Guzman MD   24  7:48 PM

## 2024-07-31 NOTE — PROGRESS NOTES
Pt was seen in the NH.  Pt is in usual state , no complaint  General appearance: Comfortable, no distress  ROS: No SOB  Medications reviewed  Head: Normal  Neck: Soft  Heart: Regular  Lungs: Clear  Abdomen: soft    Plan:   1)clinically doing fine  2) To continue losartan 50 mg bid    Problem List Items Addressed This Visit       Primary hypertension - Primary    Acquired absence of great toe, unspecified laterality (Multi)

## 2024-08-27 ENCOUNTER — NURSING HOME VISIT (OUTPATIENT)
Dept: POST ACUTE CARE | Facility: EXTERNAL LOCATION | Age: 64
End: 2024-08-27
Payer: MEDICARE

## 2024-08-27 DIAGNOSIS — E78.1 HYPERTRIGLYCERIDEMIA: Primary | ICD-10-CM

## 2024-08-27 PROCEDURE — 99308 SBSQ NF CARE LOW MDM 20: CPT | Performed by: INTERNAL MEDICINE

## 2024-08-27 NOTE — PROGRESS NOTES
Pt was seen in the NH.  Pt is in usual state , no complaint  General appearance: Comfortable, no distress  ROS: No SOB  Medications reviewed  Head: Normal  Neck: Soft  Heart: Regular  Lungs: Clear  Abdomen: soft    Plan:   1)clinically doing fine  2) To continue fenofibrate 54 mg daily    Problem List Items Addressed This Visit       Hypertriglyceridemia - Primary

## 2024-08-27 NOTE — LETTER
Patient: Enid Thorne  : 1960    Encounter Date: 2024    Pt was seen in the NH.  Pt is in usual state , no complaint  General appearance: Comfortable, no distress  ROS: No SOB  Medications reviewed  Head: Normal  Neck: Soft  Heart: Regular  Lungs: Clear  Abdomen: soft    Plan:   1)clinically doing fine  2) To continue fenofibrate 54 mg daily    Problem List Items Addressed This Visit       Hypertriglyceridemia - Primary          Electronically Signed By: Chriss Guzman MD   24  6:42 PM

## 2024-09-29 ENCOUNTER — NURSING HOME VISIT (OUTPATIENT)
Dept: POST ACUTE CARE | Facility: EXTERNAL LOCATION | Age: 64
End: 2024-09-29
Payer: MEDICARE

## 2024-09-29 DIAGNOSIS — I10 PRIMARY HYPERTENSION: Primary | ICD-10-CM

## 2024-09-29 PROCEDURE — 99308 SBSQ NF CARE LOW MDM 20: CPT | Performed by: INTERNAL MEDICINE

## 2024-09-29 NOTE — LETTER
Patient: Enid Thorne  : 1960    Encounter Date: 2024    Pt was seen in the NH.  Pt is in usual state , no complaint  General appearance: Comfortable, no distress  ROS: No SOB  Medications reviewed  Head: Normal  Neck: Soft  Heart: Regular  Lungs: Clear  Abdomen: soft    Plan:   1)clinically doing fine  2) To continue losartan 100 mg daily    Problem List Items Addressed This Visit       Primary hypertension - Primary          Electronically Signed By: Chriss Guzman MD   24  5:56 PM

## 2024-09-29 NOTE — PROGRESS NOTES
Pt was seen in the NH.  Pt is in usual state , no complaint  General appearance: Comfortable, no distress  ROS: No SOB  Medications reviewed  Head: Normal  Neck: Soft  Heart: Regular  Lungs: Clear  Abdomen: soft    Plan:   1)clinically doing fine  2) To continue losartan 100 mg daily    Problem List Items Addressed This Visit       Primary hypertension - Primary

## 2024-10-13 ENCOUNTER — NURSING HOME VISIT (OUTPATIENT)
Dept: POST ACUTE CARE | Facility: EXTERNAL LOCATION | Age: 64
End: 2024-10-13
Payer: MEDICARE

## 2024-10-13 VITALS — DIASTOLIC BLOOD PRESSURE: 78 MMHG | WEIGHT: 315 LBS | SYSTOLIC BLOOD PRESSURE: 124 MMHG

## 2024-10-13 DIAGNOSIS — Z00.00 HEALTHCARE MAINTENANCE: Primary | ICD-10-CM

## 2024-10-13 PROCEDURE — G0439 PPPS, SUBSEQ VISIT: HCPCS | Performed by: INTERNAL MEDICINE

## 2024-10-13 ASSESSMENT — ACTIVITIES OF DAILY LIVING (ADL)
MANAGING_FINANCES: TOTAL CARE
GROCERY_SHOPPING: TOTAL CARE
BATHING: DEPENDENT
DRESSING: DEPENDENT
DOING_HOUSEWORK: TOTAL CARE
TAKING_MEDICATION: TOTAL CARE

## 2024-10-13 ASSESSMENT — PATIENT HEALTH QUESTIONNAIRE - PHQ9
2. FEELING DOWN, DEPRESSED OR HOPELESS: NOT AT ALL
SUM OF ALL RESPONSES TO PHQ9 QUESTIONS 1 AND 2: 0
1. LITTLE INTEREST OR PLEASURE IN DOING THINGS: NOT AT ALL

## 2024-10-13 NOTE — LETTER
Patient: Enid Thorne  : 1960    Encounter Date: 10/13/2024    Pt was seen in the NH for wellness exam  Pt is in usual state , no complaint  General appearance: Comfortable, no distress  No screening test indicated    THE FOLLOWING WAS REVIEWED ON THE FACILITY EMR:  PMH, MEDICATIONS, ORDERS, ALLERGY, IMMUNIZATION AND MDS      Problem List Items Addressed This Visit    None  Visit Diagnoses       Healthcare maintenance    -  Primary               Electronically Signed By: Chriss Guzman MD   10/13/24  8:11 PM

## 2024-10-14 NOTE — PROGRESS NOTES
Pt was seen in the NH for wellness exam  Pt is in usual state , no complaint  General appearance: Comfortable, no distress  No screening test indicated    THE FOLLOWING WAS REVIEWED ON THE FACILITY EMR:  PMH, MEDICATIONS, ORDERS, ALLERGY, IMMUNIZATION AND MDS      Problem List Items Addressed This Visit    None  Visit Diagnoses       Healthcare maintenance    -  Primary

## 2024-10-31 ENCOUNTER — NURSING HOME VISIT (OUTPATIENT)
Dept: POST ACUTE CARE | Facility: EXTERNAL LOCATION | Age: 64
End: 2024-10-31
Payer: MEDICARE

## 2024-10-31 DIAGNOSIS — E11.65 TYPE 2 DIABETES MELLITUS WITH HYPERGLYCEMIA, WITH LONG-TERM CURRENT USE OF INSULIN: Primary | ICD-10-CM

## 2024-10-31 DIAGNOSIS — Z79.4 TYPE 2 DIABETES MELLITUS WITH HYPERGLYCEMIA, WITH LONG-TERM CURRENT USE OF INSULIN: Primary | ICD-10-CM

## 2024-10-31 PROCEDURE — 99308 SBSQ NF CARE LOW MDM 20: CPT | Performed by: INTERNAL MEDICINE

## 2024-11-26 ENCOUNTER — NURSING HOME VISIT (OUTPATIENT)
Dept: POST ACUTE CARE | Facility: EXTERNAL LOCATION | Age: 64
End: 2024-11-26
Payer: MEDICARE

## 2024-11-26 DIAGNOSIS — I10 PRIMARY HYPERTENSION: Primary | ICD-10-CM

## 2024-11-26 PROCEDURE — 99308 SBSQ NF CARE LOW MDM 20: CPT | Performed by: INTERNAL MEDICINE

## 2024-11-26 NOTE — PROGRESS NOTES
Pt was seen in the NH.  Pt is in usual state , no complaint  General appearance: Comfortable, no distress  ROS: No SOB  Medications reviewed  Head: Normal  Neck: Soft  Heart: Regular  Lungs: Clear  Abdomen: soft    Plan:   1)clinically doing fine  2) To continue coreg 3.125 mg bid    Problem List Items Addressed This Visit       Primary hypertension - Primary

## 2024-11-26 NOTE — LETTER
Patient: Enid Thorne  : 1960    Encounter Date: 2024    Pt was seen in the NH.  Pt is in usual state , no complaint  General appearance: Comfortable, no distress  ROS: No SOB  Medications reviewed  Head: Normal  Neck: Soft  Heart: Regular  Lungs: Clear  Abdomen: soft    Plan:   1)clinically doing fine  2) To continue coreg 3.125 mg bid    Problem List Items Addressed This Visit       Primary hypertension - Primary          Electronically Signed By: Chriss Guzman MD   24  5:45 PM

## 2024-12-12 ENCOUNTER — NURSING HOME VISIT (OUTPATIENT)
Dept: POST ACUTE CARE | Facility: EXTERNAL LOCATION | Age: 64
End: 2024-12-12
Payer: MEDICARE

## 2024-12-12 DIAGNOSIS — E78.00 HYPERCHOLESTEROLEMIA: Primary | ICD-10-CM

## 2024-12-12 PROCEDURE — 99308 SBSQ NF CARE LOW MDM 20: CPT | Performed by: INTERNAL MEDICINE

## 2024-12-12 NOTE — LETTER
Patient: Enid Thorne  : 1960    Encounter Date: 2024    Pt was seen in the NH.  Pt is in usual state , no complaint  General appearance: Comfortable, no distress  ROS: No SOB  Medications reviewed  Head: Normal  Neck: Soft  Heart: Regular  Lungs: Clear  Abdomen: soft    Plan:   1)clinically doing fine  2) To continue lipitor 80 mg daily    Problem List Items Addressed This Visit    None  Visit Diagnoses       Hypercholesterolemia    -  Primary               Electronically Signed By: Chriss Guzman MD   24  4:01 PM

## 2025-01-29 ENCOUNTER — NURSING HOME VISIT (OUTPATIENT)
Dept: POST ACUTE CARE | Facility: EXTERNAL LOCATION | Age: 65
End: 2025-01-29
Payer: MEDICARE

## 2025-01-29 ENCOUNTER — TELEPHONE (OUTPATIENT)
Dept: POST ACUTE CARE | Facility: EXTERNAL LOCATION | Age: 65
End: 2025-01-29

## 2025-01-29 DIAGNOSIS — H34.8390 BRANCH RETINAL VEIN OCCLUSION WITH MACULAR EDEMA, UNSPECIFIED LATERALITY: ICD-10-CM

## 2025-01-29 DIAGNOSIS — E11.59 TYPE 2 DIABETES MELLITUS WITH OTHER CIRCULATORY COMPLICATION, WITH LONG-TERM CURRENT USE OF INSULIN: ICD-10-CM

## 2025-01-29 DIAGNOSIS — I69.359 HEMIPLEGIA AFFECTING DOMINANT SIDE, POST-STROKE (MULTI): ICD-10-CM

## 2025-01-29 DIAGNOSIS — E66.01 MORBID OBESITY (MULTI): ICD-10-CM

## 2025-01-29 DIAGNOSIS — F01.B0 MODERATE VASCULAR DEMENTIA WITHOUT BEHAVIORAL DISTURBANCE, PSYCHOTIC DISTURBANCE, MOOD DISTURBANCE, OR ANXIETY: ICD-10-CM

## 2025-01-29 DIAGNOSIS — N18.32 STAGE 3B CHRONIC KIDNEY DISEASE (MULTI): ICD-10-CM

## 2025-01-29 DIAGNOSIS — Z79.4 TYPE 2 DIABETES MELLITUS WITH OTHER CIRCULATORY COMPLICATION, WITH LONG-TERM CURRENT USE OF INSULIN: ICD-10-CM

## 2025-01-29 PROCEDURE — 99308 SBSQ NF CARE LOW MDM 20: CPT | Performed by: INTERNAL MEDICINE

## 2025-01-29 NOTE — LETTER
Patient: Enid Thorne  : 1960    Encounter Date: 2025    Pt was seen in the NH.  Pt is in usual state , no complaint  General appearance: Comfortable, no distress  ROS: No SOB  Medications reviewed  Head: Normal  Neck: Soft  Heart: Regular  Lungs: Clear  Abdomen: soft    Hga1c 8.2    Plan:   1)clinically doing fine, dementia clinically stable, advised to lose wt, h/o CVA clinically stable  2) CMP lipid urine microalbumin, referral to ophthalmology    Problem List Items Addressed This Visit       Vascular dementia without behavioral disturbance, psychotic disturbance, mood disturbance, or anxiety    Stage 3b chronic kidney disease (Multi)    Morbid obesity (Multi)     Other Visit Diagnoses       Hemiplegia affecting dominant side, post-stroke (Multi)        Branch retinal vein occlusion with macular edema, unspecified laterality        Type 2 diabetes mellitus with other circulatory complication, with long-term current use of insulin                   Electronically Signed By: Chriss Guzman MD   25  6:46 PM

## 2025-01-29 NOTE — PROGRESS NOTES
Pt was seen in the NH.  Pt is in usual state , no complaint  General appearance: Comfortable, no distress  ROS: No SOB  Medications reviewed  Head: Normal  Neck: Soft  Heart: Regular  Lungs: Clear  Abdomen: soft    Hga1c 8.2    Plan:   1)clinically doing fine, dementia clinically stable, advised to lose wt, h/o CVA clinically stable  2) CMP lipid urine microalbumin, referral to ophthalmology    Problem List Items Addressed This Visit       Vascular dementia without behavioral disturbance, psychotic disturbance, mood disturbance, or anxiety    Stage 3b chronic kidney disease (Multi)    Morbid obesity (Multi)     Other Visit Diagnoses       Hemiplegia affecting dominant side, post-stroke (Multi)        Branch retinal vein occlusion with macular edema, unspecified laterality        Type 2 diabetes mellitus with other circulatory complication, with long-term current use of insulin

## 2025-02-26 ENCOUNTER — NURSING HOME VISIT (OUTPATIENT)
Dept: POST ACUTE CARE | Facility: EXTERNAL LOCATION | Age: 65
End: 2025-02-26
Payer: MEDICARE

## 2025-02-26 DIAGNOSIS — I10 PRIMARY HYPERTENSION: Primary | ICD-10-CM

## 2025-02-26 PROCEDURE — 99308 SBSQ NF CARE LOW MDM 20: CPT | Performed by: INTERNAL MEDICINE

## 2025-02-26 NOTE — PROGRESS NOTES
Pt was seen in the NH.  Pt is in usual state , no complaint  General appearance: Comfortable, no distress  ROS: No SOB  Medications reviewed  Head: Normal  Neck: Soft  Heart: Regular  Lungs: Clear  Abdomen: soft    Plan:   1)clinically doing fine  2) To continue losartan 50 mg bid    Problem List Items Addressed This Visit       Primary hypertension - Primary

## 2025-02-26 NOTE — LETTER
Patient: Enid Thorne  : 1960    Encounter Date: 2025    Pt was seen in the NH.  Pt is in usual state , no complaint  General appearance: Comfortable, no distress  ROS: No SOB  Medications reviewed  Head: Normal  Neck: Soft  Heart: Regular  Lungs: Clear  Abdomen: soft    Plan:   1)clinically doing fine  2) To continue losartan 50 mg bid    Problem List Items Addressed This Visit       Primary hypertension - Primary          Electronically Signed By: Chriss Guzman MD   25  2:42 PM

## 2025-03-24 ENCOUNTER — NURSING HOME VISIT (OUTPATIENT)
Dept: POST ACUTE CARE | Facility: EXTERNAL LOCATION | Age: 65
End: 2025-03-24
Payer: MEDICARE

## 2025-03-24 DIAGNOSIS — E11.65 TYPE 2 DIABETES MELLITUS WITH HYPERGLYCEMIA, WITH LONG-TERM CURRENT USE OF INSULIN: Primary | ICD-10-CM

## 2025-03-24 DIAGNOSIS — Z79.4 TYPE 2 DIABETES MELLITUS WITH HYPERGLYCEMIA, WITH LONG-TERM CURRENT USE OF INSULIN: Primary | ICD-10-CM

## 2025-03-24 PROCEDURE — 99308 SBSQ NF CARE LOW MDM 20: CPT | Performed by: INTERNAL MEDICINE

## 2025-03-24 NOTE — PROGRESS NOTES
Pt was seen in the NH.  Pt is in usual state , no complaint  General appearance: Comfortable, no distress  ROS: No SOB  Medications reviewed  Head: Normal  Neck: Soft  Heart: Regular  Lungs: Clear  Abdomen: soft    Plan:   1)clinically doing fine  2) To continue basaglar 20 unit daily    Problem List Items Addressed This Visit       Type 2 diabetes mellitus with hyperglycemia, with long-term current use of insulin - Primary

## 2025-03-24 NOTE — LETTER
Patient: Enid Thorne  : 1960    Encounter Date: 2025    Pt was seen in the NH.  Pt is in usual state , no complaint  General appearance: Comfortable, no distress  ROS: No SOB  Medications reviewed  Head: Normal  Neck: Soft  Heart: Regular  Lungs: Clear  Abdomen: soft    Plan:   1)clinically doing fine  2) To continue basaglar 20 unit daily    Problem List Items Addressed This Visit       Type 2 diabetes mellitus with hyperglycemia, with long-term current use of insulin - Primary          Electronically Signed By: Chriss Guzman MD   3/24/25  5:56 PM

## 2025-04-30 ENCOUNTER — NURSING HOME VISIT (OUTPATIENT)
Dept: POST ACUTE CARE | Facility: EXTERNAL LOCATION | Age: 65
End: 2025-04-30
Payer: MEDICARE

## 2025-04-30 DIAGNOSIS — I73.9 PERIPHERAL VASCULAR DISEASE, UNSPECIFIED (CMS-HCC): ICD-10-CM

## 2025-04-30 DIAGNOSIS — F01.B0 MODERATE VASCULAR DEMENTIA WITHOUT BEHAVIORAL DISTURBANCE, PSYCHOTIC DISTURBANCE, MOOD DISTURBANCE, OR ANXIETY: Primary | ICD-10-CM

## 2025-04-30 PROCEDURE — 99308 SBSQ NF CARE LOW MDM 20: CPT | Performed by: INTERNAL MEDICINE

## 2025-04-30 NOTE — LETTER
Patient: Enid Thorne  : 1960    Encounter Date: 2025    Pt was seen in the NH.  Pt is in usual state , no complaint  General appearance: Comfortable, no distress  ROS: No SOB  Medications reviewed  Head: Normal  Neck: Soft  Heart: Regular  Lungs: Clear  Abdomen: soft    Plan:   1)clinically doing fine  2) To continue eliquis 5 mg bid    Problem List Items Addressed This Visit       Vascular dementia without behavioral disturbance, psychotic disturbance, mood disturbance, or anxiety - Primary    Peripheral vascular disease, unspecified (CMS-HCC)          Electronically Signed By: Chriss Guzman MD   25  2:46 PM

## 2025-04-30 NOTE — PROGRESS NOTES
Pt was seen in the NH.  Pt is in usual state , no complaint  General appearance: Comfortable, no distress  ROS: No SOB  Medications reviewed  Head: Normal  Neck: Soft  Heart: Regular  Lungs: Clear  Abdomen: soft    Plan:   1)clinically doing fine  2) To continue eliquis 5 mg bid    Problem List Items Addressed This Visit       Vascular dementia without behavioral disturbance, psychotic disturbance, mood disturbance, or anxiety - Primary    Peripheral vascular disease, unspecified (CMS-HCC)

## 2025-05-27 ENCOUNTER — NURSING HOME VISIT (OUTPATIENT)
Dept: POST ACUTE CARE | Facility: EXTERNAL LOCATION | Age: 65
End: 2025-05-27
Payer: MEDICARE

## 2025-05-27 DIAGNOSIS — E66.01 MORBID (SEVERE) OBESITY DUE TO EXCESS CALORIES (MULTI): ICD-10-CM

## 2025-05-27 DIAGNOSIS — E11.59 TYPE 2 DIABETES MELLITUS WITH OTHER CIRCULATORY COMPLICATION, WITH LONG-TERM CURRENT USE OF INSULIN: Primary | ICD-10-CM

## 2025-05-27 DIAGNOSIS — N18.32 STAGE 3B CHRONIC KIDNEY DISEASE (MULTI): ICD-10-CM

## 2025-05-27 DIAGNOSIS — Z79.4 TYPE 2 DIABETES MELLITUS WITH OTHER CIRCULATORY COMPLICATION, WITH LONG-TERM CURRENT USE OF INSULIN: Primary | ICD-10-CM

## 2025-05-27 PROCEDURE — 99308 SBSQ NF CARE LOW MDM 20: CPT | Performed by: INTERNAL MEDICINE

## 2025-05-27 NOTE — LETTER
Patient: Enid Thorne  : 1960    Encounter Date: 2025    Pt was seen in the NH.  Pt is in usual state , no complaint  General appearance: Comfortable, no distress  ROS: No SOB  Medications reviewed  Head: Normal  Neck: Soft  Heart: Regular  Lungs: Clear  Abdomen: soft     GFR 33  Plan:   1)clinically doing fine  2) To continue basagar 20 mg daily, will watch wt    Problem List Items Addressed This Visit       Stage 3b chronic kidney disease (Multi)     Other Visit Diagnoses         Type 2 diabetes mellitus with other circulatory complication, with long-term current use of insulin    -  Primary      Morbid (severe) obesity due to excess calories (Multi)                   Electronically Signed By: Chriss Guzman MD   25  4:29 PM

## 2025-05-27 NOTE — PROGRESS NOTES
Pt was seen in the NH.  Pt is in usual state , no complaint  General appearance: Comfortable, no distress  ROS: No SOB  Medications reviewed  Head: Normal  Neck: Soft  Heart: Regular  Lungs: Clear  Abdomen: soft     GFR 33  Plan:   1)clinically doing fine  2) To continue basagar 20 mg daily, will watch wt    Problem List Items Addressed This Visit       Stage 3b chronic kidney disease (Multi)     Other Visit Diagnoses         Type 2 diabetes mellitus with other circulatory complication, with long-term current use of insulin    -  Primary      Morbid (severe) obesity due to excess calories (Multi)

## 2025-06-18 ENCOUNTER — NURSING HOME VISIT (OUTPATIENT)
Dept: POST ACUTE CARE | Facility: EXTERNAL LOCATION | Age: 65
End: 2025-06-18
Payer: MEDICARE

## 2025-06-18 DIAGNOSIS — I10 PRIMARY HYPERTENSION: ICD-10-CM

## 2025-06-18 DIAGNOSIS — F01.B0 MODERATE VASCULAR DEMENTIA WITHOUT BEHAVIORAL DISTURBANCE, PSYCHOTIC DISTURBANCE, MOOD DISTURBANCE, OR ANXIETY: Primary | ICD-10-CM

## 2025-06-18 PROCEDURE — 99308 SBSQ NF CARE LOW MDM 20: CPT | Performed by: INTERNAL MEDICINE

## 2025-06-18 NOTE — LETTER
Patient: Enid Thorne  : 1960    Encounter Date: 2025    Pt was seen in the NH.  Pt is in usual state , no complaint  General appearance: Comfortable, no distress  ROS: No SOB  Medications reviewed  Head: Normal  Neck: Soft  Heart: Regular  Lungs: Clear  Abdomen: soft    Plan:   1)clinically doing fine  2) To continue losartan 100 mg daily    Problem List Items Addressed This Visit       Vascular dementia without behavioral disturbance, psychotic disturbance, mood disturbance, or anxiety - Primary    Primary hypertension          Electronically Signed By: Chriss Guzman MD   25  2:34 PM

## 2025-06-18 NOTE — PROGRESS NOTES
Pt was seen in the NH.  Pt is in usual state , no complaint  General appearance: Comfortable, no distress  ROS: No SOB  Medications reviewed  Head: Normal  Neck: Soft  Heart: Regular  Lungs: Clear  Abdomen: soft    Plan:   1)clinically doing fine  2) To continue losartan 100 mg daily    Problem List Items Addressed This Visit       Vascular dementia without behavioral disturbance, psychotic disturbance, mood disturbance, or anxiety - Primary    Primary hypertension